# Patient Record
(demographics unavailable — no encounter records)

---

## 2025-01-06 NOTE — HISTORY OF PRESENT ILLNESS
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 There is no history of: cyanosis, chronic cough, excessive sweating, exercise intolerance, or syncope.  He is on no medications and he has known

## 2025-01-06 NOTE — PHYSICAL EXAM
[General Appearance - Alert] : alert [General Appearance - In No Acute Distress] : in no acute distress [General Appearance - Well Nourished] : well nourished [General Appearance - Well Developed] : well developed [General Appearance - Well-Appearing] : well appearing [Attitude Uncooperative] : cooperative [Down Syndrome] : Down Syndrome [Sclera] : the conjunctiva were normal [PERRL With Normal Accommodation] : the pupils were equal in size, round, and reactive to light [Outer Ear] : the ears and nose were normal in appearance [Examination Of The Oral Cavity] : mucous membranes were moist and pink [Oropharynx] : the oropharynx was normal [No Cough] : no cough [Auscultation Breath Sounds / Voice Sounds] : breath sounds clear to auscultation bilaterally [Respiration, Rhythm And Depth] : normal respiratory rhythm and effort [Normal Chest Appearance] : the chest was normal in appearance [Apical Impulse] : quiet precordium with normal apical impulse [Heart Rate And Rhythm] : normal heart rate and rhythm [Heart Sounds] : normal S1 and S2 [Heart Sounds Gallop] : no gallops [Heart Sounds Pericardial Friction Rub] : no pericardial rub [Edema] : no edema [Arterial Pulses] : normal upper and lower extremity pulses with no pulse delay [Heart Sounds Click] : no clicks [Systolic] : systolic [III] : a grade 3/6   [LMSB] : LMSB  [Holosystolic] : holosystolic [Med] : medium pitched [Harsh] : harsh [Early] : early [LSB] : the murmur was transmitted to the LSB [Bowel Sounds] : normal bowel sounds [Abdomen Soft] : soft [Nondistended] : nondistended [Abdomen Tenderness] : non-tender [Nail Clubbing] : no clubbing  or cyanosis of the fingers [Cervical Lymph Nodes Enlarged Anterior] : The anterior cervical nodes were normal [Cervical Lymph Nodes Enlarged Posterior] : The posterior cervical nodes were normal [] : no rash [Skin Lesions] : no lesions [Demonstrated Behavior - Infant Nonreactive To Parents] : interactive [Mood] : mood and affect were appropriate for age [FreeTextEntry3] : wears glasses

## 2025-01-06 NOTE — CARDIOLOGY SUMMARY
[Today's Date] : [unfilled] [de-identified] : 1/2/25 [FreeTextEntry1] : Sinus rhythm, rate 86 bpm, QRS axis +65 degrees, SC 0.15, QRS 0.28, QTc 0.42 seconds and is within normal limits for age. [de-identified] : 1/2/25 [FreeTextEntry2] : Summary: 1. There are muscle bundles noted in the body of the right ventricle (double chambered RV). 2. There is no significant gradient across the pulmonary valve or along the subpulmonary region. 3. Restrictive, membranous VSD previously reported was not seen today. 4. Normal left ventricular size, morphology and systolic function. 5. Normal systolic configuration of interventricular septum. 6. Qualitatively normal right ventricular systolic function. 7. Trivial tricuspid valve regurgitation, peak systolic instantaneous gradient 24.2 mmHg. 8. No pericardial effusion.

## 2025-01-06 NOTE — CONSULT LETTER
[Today's Date] : [unfilled] [Name] : Name: [unfilled] [] : : ~~ [Today's Date:] : [unfilled] [Dear  ___:] : Dear Dr. [unfilled]: [Consult - Single Provider] : Thank you very much for allowing me to participate in the care of this patient. If you have any questions, please do not hesitate to contact me. [Sincerely,] : Sincerely, [FreeTextEntry9] : 1/2/325 [FreeTextEntry4] : Dr. Paresh Krause [FreeTextEntry5] : [address] [FreeTextEntry6] : [place] [id number] [FreeTextEntry1] : 1/2/25 [de-identified] : Hao Shane MD, FAAP, FACC, FAHA Chief Emeritus, Division of Pediatric Cardiology [facility] Professor, Department of Pediatrics, [facility]

## 2025-01-06 NOTE — CONSULT LETTER
[Today's Date] : [unfilled] [Name] : Name: [unfilled] [] : : ~~ [Today's Date:] : [unfilled] [Dear  ___:] : Dear Dr. [unfilled]: [Consult - Single Provider] : Thank you very much for allowing me to participate in the care of this patient. If you have any questions, please do not hesitate to contact me. [Sincerely,] : Sincerely, [FreeTextEntry9] : 1/2/325 [FreeTextEntry4] : Dr. Paresh Krause [FreeTextEntry5] : [address] [FreeTextEntry6] : [place] [id number] [FreeTextEntry1] : 1/2/25 [de-identified] : Hao Shane MD, FAAP, FACC, FAHA Chief Emeritus, Division of Pediatric Cardiology [facility] Professor, Department of Pediatrics, [facility]

## 2025-01-06 NOTE — CARDIOLOGY SUMMARY
[Today's Date] : [unfilled] [de-identified] : 1/2/25 [FreeTextEntry1] : Sinus rhythm, rate 86 bpm, QRS axis +65 degrees, GA 0.15, QRS 0.28, QTc 0.42 seconds and is within normal limits for age. [de-identified] : 1/2/25 [FreeTextEntry2] : Summary: 1. There are muscle bundles noted in the body of the right ventricle (double chambered RV). 2. There is no significant gradient across the pulmonary valve or along the subpulmonary region. 3. Restrictive, membranous VSD previously reported was not seen today. 4. Normal left ventricular size, morphology and systolic function. 5. Normal systolic configuration of interventricular septum. 6. Qualitatively normal right ventricular systolic function. 7. Trivial tricuspid valve regurgitation, peak systolic instantaneous gradient 24.2 mmHg. 8. No pericardial effusion.

## 2025-01-06 NOTE — CONSULT LETTER
[Today's Date] : [unfilled] [Name] : Name: [unfilled] [] : : ~~ [Today's Date:] : [unfilled] [Dear  ___:] : Dear Dr. [unfilled]: [Consult - Single Provider] : Thank you very much for allowing me to participate in the care of this patient. If you have any questions, please do not hesitate to contact me. [Sincerely,] : Sincerely, [FreeTextEntry9] : 1/2/325 [FreeTextEntry4] : Dr. Paresh Krause [FreeTextEntry5] : [address] [FreeTextEntry6] : [place] [id number] [FreeTextEntry1] : 1/2/25 [de-identified] : Hao Shane MD, FAAP, FACC, FAHA Chief Emeritus, Division of Pediatric Cardiology [facility] Professor, Department of Pediatrics, [facility]

## 2025-01-06 NOTE — REASON FOR VISIT
[Follow-Up] : a follow-up visit for [Ventricular Septal Defect] : a ventricular septal defect [Patient] : patient [Mother] : mother [FreeTextEntry1] : Double chambered right ventricle

## 2025-01-06 NOTE — CARDIOLOGY SUMMARY
[Today's Date] : [unfilled] [de-identified] : 1/2/25 [FreeTextEntry1] : Sinus rhythm, rate 86 bpm, QRS axis +65 degrees, VT 0.15, QRS 0.28, QTc 0.42 seconds and is within normal limits for age. [de-identified] : 1/2/25 [FreeTextEntry2] : Summary: 1. There are muscle bundles noted in the body of the right ventricle (double chambered RV). 2. There is no significant gradient across the pulmonary valve or along the subpulmonary region. 3. Restrictive, membranous VSD previously reported was not seen today. 4. Normal left ventricular size, morphology and systolic function. 5. Normal systolic configuration of interventricular septum. 6. Qualitatively normal right ventricular systolic function. 7. Trivial tricuspid valve regurgitation, peak systolic instantaneous gradient 24.2 mmHg. 8. No pericardial effusion.